# Patient Record
Sex: MALE | NOT HISPANIC OR LATINO | Employment: FULL TIME | URBAN - METROPOLITAN AREA
[De-identification: names, ages, dates, MRNs, and addresses within clinical notes are randomized per-mention and may not be internally consistent; named-entity substitution may affect disease eponyms.]

---

## 2018-04-05 ENCOUNTER — HOSPITAL ENCOUNTER (EMERGENCY)
Facility: CLINIC | Age: 33
Discharge: HOME | End: 2018-04-06
Attending: INTERNAL MEDICINE | Admitting: INTERNAL MEDICINE

## 2018-04-05 ENCOUNTER — APPOINTMENT (OUTPATIENT)
Dept: GENERAL RADIOLOGY | Facility: CLINIC | Age: 33
End: 2018-04-05
Attending: INTERNAL MEDICINE

## 2018-04-05 DIAGNOSIS — R07.89 OTHER CHEST PAIN: ICD-10-CM

## 2018-04-05 DIAGNOSIS — Z87.891 PERSONAL HISTORY OF TOBACCO USE, PRESENTING HAZARDS TO HEALTH: ICD-10-CM

## 2018-04-05 DIAGNOSIS — R07.9 ACUTE CHEST PAIN: ICD-10-CM

## 2018-04-05 LAB
ALBUMIN SERPL-MCNC: 3.8 G/DL (ref 3.4–5)
ALBUMIN UR-MCNC: NEGATIVE MG/DL
ALP SERPL-CCNC: 57 U/L (ref 40–150)
ALT SERPL W P-5'-P-CCNC: 24 U/L (ref 0–70)
ANION GAP SERPL CALCULATED.3IONS-SCNC: 9 MMOL/L (ref 3–14)
APPEARANCE UR: CLEAR
AST SERPL W P-5'-P-CCNC: 21 U/L (ref 0–45)
BASOPHILS # BLD AUTO: 0.1 10E9/L (ref 0–0.2)
BASOPHILS NFR BLD AUTO: 1 %
BILIRUB SERPL-MCNC: 0.2 MG/DL (ref 0.2–1.3)
BILIRUB UR QL STRIP: NEGATIVE
BUN SERPL-MCNC: 17 MG/DL (ref 7–30)
CALCIUM SERPL-MCNC: 8.8 MG/DL (ref 8.5–10.1)
CHLORIDE SERPL-SCNC: 107 MMOL/L (ref 94–109)
CO2 SERPL-SCNC: 26 MMOL/L (ref 20–32)
COLOR UR AUTO: YELLOW
CREAT SERPL-MCNC: 0.77 MG/DL (ref 0.66–1.25)
D DIMER PPP FEU-MCNC: 0.3 UG/ML FEU (ref 0–0.5)
DIFFERENTIAL METHOD BLD: NORMAL
EOSINOPHIL # BLD AUTO: 0.2 10E9/L (ref 0–0.7)
EOSINOPHIL NFR BLD AUTO: 3.3 %
ERYTHROCYTE [DISTWIDTH] IN BLOOD BY AUTOMATED COUNT: 12 % (ref 10–15)
GFR SERPL CREATININE-BSD FRML MDRD: >90 ML/MIN/1.7M2
GLUCOSE SERPL-MCNC: 109 MG/DL (ref 70–99)
GLUCOSE UR STRIP-MCNC: NEGATIVE MG/DL
HCT VFR BLD AUTO: 42 % (ref 40–53)
HGB BLD-MCNC: 14 G/DL (ref 13.3–17.7)
HGB UR QL STRIP: NEGATIVE
IMM GRANULOCYTES # BLD: 0 10E9/L (ref 0–0.4)
IMM GRANULOCYTES NFR BLD: 0.4 %
INR PPP: 0.95 (ref 0.86–1.14)
KETONES UR STRIP-MCNC: NEGATIVE MG/DL
LEUKOCYTE ESTERASE UR QL STRIP: NEGATIVE
LYMPHOCYTES # BLD AUTO: 2.3 10E9/L (ref 0.8–5.3)
LYMPHOCYTES NFR BLD AUTO: 30.8 %
MCH RBC QN AUTO: 29.6 PG (ref 26.5–33)
MCHC RBC AUTO-ENTMCNC: 33.3 G/DL (ref 31.5–36.5)
MCV RBC AUTO: 89 FL (ref 78–100)
MONOCYTES # BLD AUTO: 0.5 10E9/L (ref 0–1.3)
MONOCYTES NFR BLD AUTO: 6.2 %
NEUTROPHILS # BLD AUTO: 4.3 10E9/L (ref 1.6–8.3)
NEUTROPHILS NFR BLD AUTO: 58.3 %
NITRATE UR QL: NEGATIVE
NRBC # BLD AUTO: 0 10*3/UL
NRBC BLD AUTO-RTO: 0 /100
NT-PROBNP SERPL-MCNC: 20 PG/ML (ref 0–450)
PH UR STRIP: 6.5 PH (ref 5–7)
PLATELET # BLD AUTO: 179 10E9/L (ref 150–450)
POTASSIUM SERPL-SCNC: 4 MMOL/L (ref 3.4–5.3)
PROT SERPL-MCNC: 7.3 G/DL (ref 6.8–8.8)
RBC # BLD AUTO: 4.73 10E12/L (ref 4.4–5.9)
RBC #/AREA URNS AUTO: 0 /HPF (ref 0–2)
SODIUM SERPL-SCNC: 142 MMOL/L (ref 133–144)
SOURCE: NORMAL
SP GR UR STRIP: 1.01 (ref 1–1.03)
TROPONIN I BLD-MCNC: 0 UG/L (ref 0–0.1)
TROPONIN I SERPL-MCNC: <0.015 UG/L (ref 0–0.04)
UROBILINOGEN UR STRIP-MCNC: NORMAL MG/DL (ref 0–2)
WBC # BLD AUTO: 7.3 10E9/L (ref 4–11)
WBC #/AREA URNS AUTO: <1 /HPF (ref 0–5)

## 2018-04-05 PROCEDURE — 81001 URINALYSIS AUTO W/SCOPE: CPT | Performed by: INTERNAL MEDICINE

## 2018-04-05 PROCEDURE — 83880 ASSAY OF NATRIURETIC PEPTIDE: CPT | Performed by: INTERNAL MEDICINE

## 2018-04-05 PROCEDURE — 85025 COMPLETE CBC W/AUTO DIFF WBC: CPT | Performed by: INTERNAL MEDICINE

## 2018-04-05 PROCEDURE — 93005 ELECTROCARDIOGRAM TRACING: CPT

## 2018-04-05 PROCEDURE — 99285 EMERGENCY DEPT VISIT HI MDM: CPT | Mod: 25 | Performed by: INTERNAL MEDICINE

## 2018-04-05 PROCEDURE — 93010 ELECTROCARDIOGRAM REPORT: CPT | Mod: Z6 | Performed by: INTERNAL MEDICINE

## 2018-04-05 PROCEDURE — 99285 EMERGENCY DEPT VISIT HI MDM: CPT | Mod: 25

## 2018-04-05 PROCEDURE — 71045 X-RAY EXAM CHEST 1 VIEW: CPT

## 2018-04-05 PROCEDURE — 80053 COMPREHEN METABOLIC PANEL: CPT | Performed by: INTERNAL MEDICINE

## 2018-04-05 PROCEDURE — 84484 ASSAY OF TROPONIN QUANT: CPT

## 2018-04-05 PROCEDURE — 85379 FIBRIN DEGRADATION QUANT: CPT | Performed by: INTERNAL MEDICINE

## 2018-04-05 PROCEDURE — 84484 ASSAY OF TROPONIN QUANT: CPT | Performed by: INTERNAL MEDICINE

## 2018-04-05 PROCEDURE — 85610 PROTHROMBIN TIME: CPT | Performed by: INTERNAL MEDICINE

## 2018-04-05 ASSESSMENT — ENCOUNTER SYMPTOMS
CHEST TIGHTNESS: 1
SHORTNESS OF BREATH: 1

## 2018-04-05 NOTE — ED AVS SNAPSHOT
[facility], Emergency Department    [address]    [facility] [address]    Phone:  [phone]    Fax:  [phone]                                       Melvi Rebolledo   MRN: [id number]    Department:  [facility], Emergency Department   Date of Visit:  4/5/2018           After Visit Summary Signature Page     I have received my discharge instructions, and my questions have been answered. I have discussed any challenges I see with this plan with the nurse or doctor.    ..........................................................................................................................................  Patient/Patient Representative Signature      ..........................................................................................................................................  Patient Representative Print Name and Relationship to Patient    ..................................................               ................................................  Date                                            Time    ..........................................................................................................................................  Reviewed by Signature/Title    ...................................................              ..............................................  Date                                                            Time

## 2018-04-05 NOTE — ED AVS SNAPSHOT
[facility], Emergency Department    [address]    [place] [address]    Phone:  [phone]    Fax:  [phone]                                       Melvi Rebolledo   MRN: [id number]    Department:  [facility], Emergency Department   Date of Visit:  4/5/2018           Patient Information     Date Of Birth          1985        Your diagnoses for this visit were:     Acute chest pain        You were seen by Ashley Tsai MD.        Discharge Instructions          *CHEST PAIN, UNCERTAIN CAUSE    Based on your exam today, the exact cause of your chest pain is not certain. Your condition does not seem serious at this time, and your pain does not appear to be coming from your heart. However, sometimes the signs of a serious problem take more time to appear. Therefore, watch for the warning signs listed below.  HOME CARE:  1. Rest today and avoid strenuous activity.  2. Take any prescribed medicine as directed.  FOLLOW UP with your doctor in 1-3 days.   GET PROMPT MEDICAL ATTENTION if any of the following occur:    A change in the type of pain: if it feels different, becomes more severe, lasts longer, or begins to spread into your shoulder, arm, neck, jaw or back    Shortness of breath or increased pain with breathing    Weakness, dizziness, or fainting    Cough with blood or dark colored sputum (phlegm)    Fever over 101  F (38.3  C)    Swelling, pain or redness in one leg    1627-7163 [employer]. [address] [place] [address]. All rights reserved. This information is not intended as a substitute for professional medical care. Always follow your healthcare professional's instructions.  This information has been modified by your health care provider with permission from the publisher.  Please make an appointment to follow up with [facility] (phone: [phone], [facility] (phone: [phone]) and [facility] (phone: [phone]) in 3-7  days even if entirely better for possible stress test.     24 Hour Appointment Hotline       To make an appointment at any [facility] clinic, call [phone] ([phone]). If you don't have a family doctor or clinic, we will help you find one. [facility] clinics are conveniently located to serve the needs of you and your family.             Review of your medicines      Notice     You have not been prescribed any medications.            Procedures and tests performed during your visit     Procedure/Test Number of Times Performed    CBC with platelets differential 1    Cardiac Continuous Monitoring 1    Comprehensive metabolic panel 1    D dimer quantitative 1    EKG 12 lead 1    INR 1    ISTAT troponin nursing POCT 2    Nt probnp inpatient (BNP) 1    Troponin I 1    Troponin POCT 2    UA with Microscopic 1    XR Chest Port 1 View 1      Orders Needing Specimen Collection     None      Pending Results     Date and Time Order Name Status Description    4/5/2018 2132 XR Chest Port 1 View Preliminary             Pending Culture Results     No orders found for last 3 day(s).            Pending Results Instructions     If you had any lab results that were not finalized at the time of your Discharge, you can call the ED Lab Result RN at [phone]. You will be contacted by this team for any positive Lab results or changes in treatment. The nurses are available 7 days a week from 10A to 6:30P.  You can leave a message 24 hours per day and they will return your call.        Thank you for choosing [facility]       Thank you for choosing [facility] for your care. Our goal is always to provide you with excellent care. Hearing back from our patients is one way we can continue to improve our services. Please take a few minutes to complete the written survey that you may receive in the mail after you visit with us. Thank you!        [employer] Information     [employer] lets you send messages to your doctor, view your test results,  "renew your prescriptions, schedule appointments and more. To sign up, go to [email or web address] . Click on \"Log in\" on the left side of the screen, which will take you to the Welcome page. Then click on \"Sign up Now\" on the right side of the page.     You will be asked to enter the access code listed below, as well as some personal information. Please follow the directions to create your username and password.     Your access code is: [email or web address]  Expires: 2018 12:55 AM     Your access code will  in 90 days. If you need help or a new code, please call your [place] [facility] [place] clinic or [phone].        Care EveryWhere ID     This is your Care EveryWhere ID. This could be used by other organizations to access your [facility] medical records  [id number]        Equal Access to Services     DINORAH ALVARADO : Hadjocelin Urena, chance whipple, joe ron, mala metz . So [facility] [phone].    ATENCIÓN: Si habla español, tiene a adam disposición servicios gratuitos de asistencia lingüística. Llame al [phone].    We comply with applicable federal civil rights laws and [place] laws. We do not discriminate on the basis of race, color, national origin, age, disability, sex, sexual orientation, or gender identity.            After Visit Summary       This is your record. Keep this with you and show to your community pharmacist(s) and doctor(s) at your next visit.                  "

## 2018-04-06 VITALS
RESPIRATION RATE: 18 BRPM | BODY MASS INDEX: 33.61 KG/M2 | HEART RATE: 79 BPM | WEIGHT: 260 LBS | OXYGEN SATURATION: 97 % | TEMPERATURE: 98.3 F | DIASTOLIC BLOOD PRESSURE: 75 MMHG | SYSTOLIC BLOOD PRESSURE: 134 MMHG

## 2018-04-06 LAB
INTERPRETATION ECG - MUSE: NORMAL
TROPONIN I BLD-MCNC: 0 UG/L (ref 0–0.1)

## 2018-04-06 PROCEDURE — 84484 ASSAY OF TROPONIN QUANT: CPT

## 2018-04-06 RX ORDER — ONDANSETRON 2 MG/ML
4 INJECTION INTRAMUSCULAR; INTRAVENOUS ONCE
Status: DISCONTINUED | OUTPATIENT
Start: 2018-04-06 | End: 2018-04-06

## 2018-04-06 NOTE — DISCHARGE INSTRUCTIONS
*CHEST PAIN, UNCERTAIN CAUSE    Based on your exam today, the exact cause of your chest pain is not certain. Your condition does not seem serious at this time, and your pain does not appear to be coming from your heart. However, sometimes the signs of a serious problem take more time to appear. Therefore, watch for the warning signs listed below.  HOME CARE:  1. Rest today and avoid strenuous activity.  2. Take any prescribed medicine as directed.  FOLLOW UP with your doctor in 1-3 days.   GET PROMPT MEDICAL ATTENTION if any of the following occur:    A change in the type of pain: if it feels different, becomes more severe, lasts longer, or begins to spread into your shoulder, arm, neck, jaw or back    Shortness of breath or increased pain with breathing    Weakness, dizziness, or fainting    Cough with blood or dark colored sputum (phlegm)    Fever over 101  F (38.3  C)    Swelling, pain or redness in one leg    8422-0475 [employer]. [address] [place] [address]. All rights reserved. This information is not intended as a substitute for professional medical care. Always follow your healthcare professional's instructions.  This information has been modified by your health care provider with permission from the publisher.  Please make an appointment to follow up with [facility] (phone: [phone], [facility] (phone: [phone]) and [facility] (phone: [phone]) in 3-7 days even if entirely better for possible stress test.

## 2018-04-06 NOTE — ED PROVIDER NOTES
"  History     Chief Complaint   Patient presents with     Chest Pain     Pt reports on/off chest heaviness for for a month. Today chest pain started around 6PM-took one aspirin 325mg and slightly better. SOB earlier.     HPI  Melvi Rebolledo is a 32 year old male who presents to the Emergency Department with chest pain. Patient reports that he has been having intermittent chest pain for the past month. Earlier he said he was having shortness of breath which led to his trip to the ED. He is not sure if it is anxiety, or because of stress. He says his chest feels \"puffed up\". Patient denies history of diabetes, or high cholesterol. Although, patient notes that he has high blood pressure every time he goes to the clinic. Patient states that Asprin makes him feel better and that he took 2 325 mg of Asprin a few hours ago. He notes that walking makes him feel better as well. He used to smoke a pack a day but has not for four years. His father had a heart attack at 50, but the patient himself has never had a heart attack nor has he had a stress test or cath.    History reviewed. No pertinent past medical history.    History reviewed. No pertinent surgical history.    No family history on file.    Social History   Substance Use Topics     Smoking status: Former Smoker     Packs/day: 1.00     Types: Cigarettes     Smokeless tobacco: Never Used     Alcohol use No       No current facility-administered medications for this encounter.      No current outpatient prescriptions on file.        Allergies   Allergen Reactions     Pineapple Hives      Fresh pineapple Swelling          I have reviewed the Medications, Allergies, Past Medical and Surgical History, and Social History in the Epic system.    Review of Systems   Respiratory: Positive for chest tightness and shortness of breath.    Cardiovascular: Positive for chest pain.   All other systems reviewed and are negative.      Physical Exam   BP: 145/75  Pulse: 84  Temp: 98 "  F (36.7  C)  Resp: 18  Weight: 117.9 kg (260 lb)  SpO2: 98 %      Physical Exam   Constitutional: He is oriented to person, place, and time. No distress.   HENT:   Head: Atraumatic.   Mouth/Throat: Oropharynx is clear and moist. No oropharyngeal exudate.   Eyes: Pupils are equal, round, and reactive to light. No scleral icterus.   Neck: Neck supple. No JVD present.   Cardiovascular: Normal rate, normal heart sounds and intact distal pulses.  Exam reveals no gallop and no friction rub.    No murmur heard.  Pulmonary/Chest: Effort normal and breath sounds normal. No respiratory distress. He has no wheezes. He has no rales. He exhibits no tenderness.   Abdominal: Soft. Bowel sounds are normal. He exhibits no distension and no mass. There is no tenderness. There is no rebound and no guarding.   Musculoskeletal: He exhibits no edema or tenderness.   Neurological: He is alert and oriented to person, place, and time. No cranial nerve deficit. Coordination normal.   Skin: Skin is warm. No rash noted. He is not diaphoretic.       ED Course     ED Course     Procedures             EKG Interpretation:      Interpreted byDr. Tsai  Time reviewed: 21:45  Symptoms at time of EKG: Chest pain   Rhythm: normal sinus   Rate: 71 bpm  Axis: Normal  Ectopy: none  Conduction: normal  ST Segments/ T Waves: No ST-T wave changes  Q Waves: none  Comparison to prior: No old EKG available    Clinical Impression: no acute changes              Results for orders placed or performed during the hospital encounter of 04/05/18 (from the past 24 hour(s))   CBC with platelets differential     Status: None    Collection Time: 04/05/18  9:58 PM   Result Value Ref Range    WBC 7.3 4.0 - 11.0 10e9/L    RBC Count 4.73 4.4 - 5.9 10e12/L    Hemoglobin 14.0 13.3 - 17.7 g/dL    Hematocrit 42.0 40.0 - 53.0 %    MCV 89 78 - 100 fl    MCH 29.6 26.5 - 33.0 pg    MCHC 33.3 31.5 - 36.5 g/dL    RDW 12.0 10.0 - 15.0 %    Platelet Count 179 150 - 450 10e9/L    Diff  Method Automated Method     % Neutrophils 58.3 %    % Lymphocytes 30.8 %    % Monocytes 6.2 %    % Eosinophils 3.3 %    % Basophils 1.0 %    % Immature Granulocytes 0.4 %    Nucleated RBCs 0 0 /100    Absolute Neutrophil 4.3 1.6 - 8.3 10e9/L    Absolute Lymphocytes 2.3 0.8 - 5.3 10e9/L    Absolute Monocytes 0.5 0.0 - 1.3 10e9/L    Absolute Eosinophils 0.2 0.0 - 0.7 10e9/L    Absolute Basophils 0.1 0.0 - 0.2 10e9/L    Abs Immature Granulocytes 0.0 0 - 0.4 10e9/L    Absolute Nucleated RBC 0.0    INR     Status: None    Collection Time: 04/05/18  9:58 PM   Result Value Ref Range    INR 0.95 0.86 - 1.14   Comprehensive metabolic panel     Status: Abnormal    Collection Time: 04/05/18  9:58 PM   Result Value Ref Range    Sodium 142 133 - 144 mmol/L    Potassium 4.0 3.4 - 5.3 mmol/L    Chloride 107 94 - 109 mmol/L    Carbon Dioxide 26 20 - 32 mmol/L    Anion Gap 9 3 - 14 mmol/L    Glucose 109 (H) 70 - 99 mg/dL    Urea Nitrogen 17 7 - 30 mg/dL    Creatinine 0.77 0.66 - 1.25 mg/dL    GFR Estimate >90 >60 mL/min/1.7m2    GFR Estimate If Black >90 >60 mL/min/1.7m2    Calcium 8.8 8.5 - 10.1 mg/dL    Bilirubin Total 0.2 0.2 - 1.3 mg/dL    Albumin 3.8 3.4 - 5.0 g/dL    Protein Total 7.3 6.8 - 8.8 g/dL    Alkaline Phosphatase 57 40 - 150 U/L    ALT 24 0 - 70 U/L    AST 21 0 - 45 U/L   D dimer quantitative     Status: None    Collection Time: 04/05/18  9:58 PM   Result Value Ref Range    D Dimer 0.3 0.0 - 0.50 ug/ml FEU   Troponin I     Status: None    Collection Time: 04/05/18  9:58 PM   Result Value Ref Range    Troponin I ES <0.015 0.000 - 0.045 ug/L   Nt probnp inpatient (BNP)     Status: None    Collection Time: 04/05/18  9:58 PM   Result Value Ref Range    N-Terminal Pro BNP Inpatient 20 0 - 450 pg/mL   Troponin POCT     Status: None    Collection Time: 04/05/18 10:02 PM   Result Value Ref Range    Troponin I 0.00 0.00 - 0.10 ug/L   XR Chest Port 1 View     Status: None (Preliminary result)    Collection Time: 04/05/18 10:59  PM    Narrative    CHEST SINGLE VIEW PORTABLE  4/5/2018 10:59 PM     HISTORY: Chest pain.    COMPARISON: 6/10/2009.    FINDINGS: The lungs are clear. Normal-sized cardiac silhouette.      Impression    IMPRESSION: No convincing evidence of active cardiopulmonary disease.   UA with Microscopic     Status: None    Collection Time: 04/05/18 11:05 PM   Result Value Ref Range    Color Urine Yellow     Appearance Urine Clear     Glucose Urine Negative NEG^Negative mg/dL    Bilirubin Urine Negative NEG^Negative    Ketones Urine Negative NEG^Negative mg/dL    Specific Gravity Urine 1.013 1.003 - 1.035    Blood Urine Negative NEG^Negative    pH Urine 6.5 5.0 - 7.0 pH    Protein Albumin Urine Negative NEG^Negative mg/dL    Urobilinogen mg/dL Normal 0.0 - 2.0 mg/dL    Nitrite Urine Negative NEG^Negative    Leukocyte Esterase Urine Negative NEG^Negative    Source Midstream Urine     WBC Urine <1 0 - 5 /HPF    RBC Urine 0 0 - 2 /HPF   Troponin POCT     Status: None    Collection Time: 04/06/18 12:12 AM   Result Value Ref Range    Troponin I 0.00 0.00 - 0.10 ug/L           Labs Ordered and Resulted from Time of ED Arrival Up to the Time of Departure from the ED   COMPREHENSIVE METABOLIC PANEL - Abnormal; Notable for the following:        Result Value    Glucose 109 (*)     All other components within normal limits   CBC WITH PLATELETS DIFFERENTIAL   INR   D DIMER QUANTITATIVE   TROPONIN I   NT PROBNP INPATIENT   ROUTINE UA WITH MICROSCOPIC   ISTAT TROPONIN NURSING POCT   CARDIAC CONTINUOUS MONITORING   ISTAT TROPONIN NURSING POCT   TROPONIN POCT   TROPONIN POCT            Assessments & Plan (with Medical Decision Making)  Chest pain not clear etiology, but risk factor pos for former smoker and FHx-father in his 50's for MI ?HTN, EKG and trop x 2 neg, offered admission to ED OBS for r/o ACS but pt declined and follow up with PMD for stress test.       I have reviewed the nursing notes.    I have reviewed the findings, diagnosis,  plan and need for follow up with the patient.    New Prescriptions    No medications on file       Final diagnoses:   Acute chest pain   IChelsey, am serving as a trained medical scribe to document services personally performed by Ashley Tsai MD, based on the provider's statements to me.   Ashley PABLO MD, was physically present and have reviewed and verified the accuracy of this note documented by Chelsey Busch.      4/5/2018   [facility], [place], EMERGENCY DEPARTMENT     Ashley Tsai MD  04/06/18 0103

## 2018-09-17 ENCOUNTER — TRANSFERRED RECORDS (OUTPATIENT)
Dept: HEALTH INFORMATION MANAGEMENT | Facility: CLINIC | Age: 33
End: 2018-09-17

## 2019-01-25 NOTE — TELEPHONE ENCOUNTER
FUTURE VISIT INFORMATION:    Date: 1/28/19    Time: 0830    Location:  Cardiology  REFERRAL INFORMATION:    Referring provider:  Self    Referring providers clinic:      Reason for visit/diagnosis:   Chest Pain      All records in [facility] and care everywhere.  Requested EKG's and Stress Echo from [facility] and an EKG from [facility].

## 2019-01-28 ENCOUNTER — PRE VISIT (OUTPATIENT)
Dept: CARDIOLOGY | Facility: CLINIC | Age: 34
End: 2019-01-28

## 2019-01-28 ENCOUNTER — OFFICE VISIT (OUTPATIENT)
Dept: CARDIOLOGY | Facility: CLINIC | Age: 34
End: 2019-01-28
Attending: INTERNAL MEDICINE
Payer: COMMERCIAL

## 2019-01-28 VITALS
SYSTOLIC BLOOD PRESSURE: 128 MMHG | HEART RATE: 68 BPM | OXYGEN SATURATION: 96 % | DIASTOLIC BLOOD PRESSURE: 79 MMHG | WEIGHT: 272 LBS | HEIGHT: 75 IN | BODY MASS INDEX: 33.82 KG/M2

## 2019-01-28 DIAGNOSIS — R07.89 NON-CARDIAC CHEST PAIN: Primary | ICD-10-CM

## 2019-01-28 PROCEDURE — 93010 ELECTROCARDIOGRAM REPORT: CPT | Mod: ZP | Performed by: INTERNAL MEDICINE

## 2019-01-28 PROCEDURE — G0463 HOSPITAL OUTPT CLINIC VISIT: HCPCS | Mod: 25,ZF

## 2019-01-28 PROCEDURE — 93005 ELECTROCARDIOGRAM TRACING: CPT | Mod: ZF

## 2019-01-28 PROCEDURE — 99204 OFFICE O/P NEW MOD 45 MIN: CPT | Mod: ZP | Performed by: INTERNAL MEDICINE

## 2019-01-28 ASSESSMENT — PAIN SCALES - GENERAL: PAINLEVEL: NO PAIN (0)

## 2019-01-28 ASSESSMENT — MIFFLIN-ST. JEOR: SCORE: 2269.41

## 2019-01-28 NOTE — NURSING NOTE
Chief Complaint   Patient presents with     New Patient     32 yo male present for chest pain     Vitals were taken and medications were reconciled. EKG was performed    Gely ZAMUDIO  8:30 AM

## 2019-01-28 NOTE — PROGRESS NOTES
"I am delighted to see Melvi Rebolledo in consultation for chest pain.      History of Present Illness:  As you know, the patient is a 33 year old  Male who has c/o chest pain for a year. He describes these as a \"squeezing, spasm\" kind of pain, randomly can occur over his left or right pectoral areas, left axillary area, back. No dyspnea, dizziness, palpitations, syncope. He works as a , shift is 4pm-midnight. He is usually busy, with a physically active job including stairs, carrying tools,etc and is able to all of those activities without chest discomfort. He usually eats around 8pm. After 9pm things slow down and he is usually at a desk, on the computer. He experiences chest pain around 10pm and symptoms would last for hours. Frequently chest pain will get worse and he reports that often after he gets home chest hurst so bad that he's writhing around in bed. He's gone to the ED multiple times over the last year, during episodes of chest pain, without any abnormalities found. He does admit that he gets very anxious when he has chest pain and gets numbness and tingling in this fingers. When he gets to the ED he usually feels better just knowing he's at a hospital. He's recently had an exercise stress echo during which he had no symptoms and stress test was normal. PCP also suggested that he seeks counseling which he is starting. He drinks 1-2 cups of coffee a day, no soda, no energy drinks, no alcohol or recreational drugs, quit smoking 4 years ago.       The following portions of the patient's history were reviewed and updated as appropriate: allergies, current medications, past family history, past medical history, past social history, past surgical history, and the problem list.    Past Medical History:  none      Medications:   none      Allergies:    Allergies   Allergen Reactions     Pineapple Hives      Fresh pineapple Swelling        Family History: father CABG, first MI 50s, now 60    Psychosocial " "history:  reports that he has quit smoking. His smoking use included cigarettes. He smoked 1.00 pack per day. he has never used smokeless tobacco. He reports that he does not drink alcohol or use drugs.  Quit smoking 4 years ago.    Review of systems:   Cardiovascular: No palpitations, chest pain, shortness of breath at rest, dyspnea with exertion, orthopnea, paroxysmal nocturia dyspnea, nocturia, dizziness, syncope.    In addition,   Constitutional: No change in weight, sleep or appetite.  Normal energy.  No fever or chills  Eyes: Negative for vision changes or eye problems  ENT: No problems with ears, nose or throat.  No difficulty swallowing.  Resp: No coughing, wheezing or shortness of breath  GI: No nausea, vomiting,  heartburn, abdominal pain, diarrhea, constipation or change in bowel habits  : No urinary frequency or dysuria, bladder or kidney problems  Musculoskeletal: No significant muscle or joint pains  Neurologic: No headaches, numbness, tingling, weakness, problems with balance or coordination  Psychiatric: No problems with anxiety, depression or mental health  Heme/immune/allergy: No history of bleeding or clotting problems or anemia.  No allergies or immune system problems  Integumentary: No rashes,worrisome lesions or skin problems      Physical examination  Vitals: /79 (BP Location: Left arm, Patient Position: Chair, Cuff Size: Adult Large)   Pulse 68   Ht 1.905 m (6' 3\")   Wt 123.4 kg (272 lb)   SpO2 96%   BMI 34.00 kg/m    BMI= Body mass index is 34 kg/m .    Constitutional: In general, the patient is a pleasant male in no apparent distress.    Eyes: PERRLA.  EOMI.  Sclerae white, not injected.  ENT/mouth: Normiocephalic and atraumatic.  Nares clear.  Pharynx without erythema or exudate.  Dentition intact.  No adenopathy.  No thyromegaly. Carotids +2/2 bilaterally without bruits.  No jugular venous distension.   Card/Vasc: The PMI is in the 5th ICS in the midclavicular line. There is " no heave. Regular rate and rhythm. Normal S1, S2. No murmur, rub, click, or gallop. Pulses are normal bilaterally throughout. No peripheral edema.  Respiratory: Clear to asculation.  No ronchi, wheezes, rales.  No dullness to percussion.   GI: Abdomen is soft, nontender, nondistended. No organomegaly. No AAA.  No bruits.   Integument: No significant bruises or rashes  Neurological: The neurological examination reveal a patient who was oriented to person, place, and time.    Psych: Normal  Heme/Lymph/Immun: no significant adenopathy      I have reviewed the following labs/imaging:  Labs 4/5/18: K 4.0, cr 0.77, hgb 14, plt 179K, TSH 0.89, A1C 5.2    I have reviewed records from [facility]. Relevant findings are:  Exercise stress echo 12/17/18: bicycle, 16 minutes, 7.8 METS, no symptoms. 86% MPHR. BP increased to 194/100 at peak exercise. Baseline EF 60% with appropriate hyperkinesis, no wall motion abnl.    I have personally and independently reviewed the following:  EKG 1/28/19: sinus 65 bpm, MN 210ms, otherwise normal    Assessment :  Chest pain. Noncardiac. Offered reassurance. Could be from esophageal spasm, GERD, exacerbated by anxiety.     Plan:  Reassurance  Consider discussing prilosec, etc with PCP      I spent a total of 30 minutes face to face with  Melvi Rebolledo during today's office visit. Over 50% of this time was spent counseling the patient and/or coordinating care regarding symptoms, offering reassurance.      The patient is to return as needed. The patient understood the treatment plan as outlined above.  There were no barriers to learning.      Jena Ellsworth MD

## 2019-01-28 NOTE — LETTER
"1/28/2019      RE: Melvi Rebolledo  [address]  [facility] [place] [address]       Dear Colleague,    Thank you for the opportunity to participate in the care of your patient, Melvi Rebolledo, at the [facility] HEART [facility]. Please see a copy of my visit note below.    I am delighted to see Melvi Rebolledo in consultation for chest pain.      History of Present Illness:  As you know, the patient is a 33 year old  Male who has c/o chest pain for a year. He describes these as a \"squeezing, spasm\" kind of pain, randomly can occur over his left or right pectoral areas, left axillary area, back. No dyspnea, dizziness, palpitations, syncope. He works as a , shift is 4pm-midnight. He is usually busy, with a physically active job including stairs, carrying tools,etc and is able to all of those activities without chest discomfort. He usually eats around 8pm. After 9pm things slow down and he is usually at a desk, on the computer. He experiences chest pain around 10pm and symptoms would last for hours. Frequently chest pain will get worse and he reports that often after he gets home chest hurst so bad that he's writhing around in bed. He's gone to the ED multiple times over the last year, during episodes of chest pain, without any abnormalities found. He does admit that he gets very anxious when he has chest pain and gets numbness and tingling in this fingers. When he gets to the ED he usually feels better just knowing he's at a hospital. He's recently had an exercise stress echo during which he had no symptoms and stress test was normal. PCP also suggested that he seeks counseling which he is starting. He drinks 1-2 cups of coffee a day, no soda, no energy drinks, no alcohol or recreational drugs, quit smoking 4 years ago.       The following portions of the patient's history were reviewed and updated as appropriate: allergies, current medications, past family " "history, past medical history, past social history, past surgical history, and the problem list.    Past Medical History:  none      Medications:   none      Allergies:    Allergies   Allergen Reactions     Pineapple Hives      Fresh pineapple Swelling        Family History: father CABG, first MI 50s, now 60    Psychosocial history:  reports that he has quit smoking. His smoking use included cigarettes. He smoked 1.00 pack per day. he has never used smokeless tobacco. He reports that he does not drink alcohol or use drugs.  Quit smoking 4 years ago.    Review of systems:   Cardiovascular: No palpitations, chest pain, shortness of breath at rest, dyspnea with exertion, orthopnea, paroxysmal nocturia dyspnea, nocturia, dizziness, syncope.    In addition,   Constitutional: No change in weight, sleep or appetite.  Normal energy.  No fever or chills  Eyes: Negative for vision changes or eye problems  ENT: No problems with ears, nose or throat.  No difficulty swallowing.  Resp: No coughing, wheezing or shortness of breath  GI: No nausea, vomiting,  heartburn, abdominal pain, diarrhea, constipation or change in bowel habits  : No urinary frequency or dysuria, bladder or kidney problems  Musculoskeletal: No significant muscle or joint pains  Neurologic: No headaches, numbness, tingling, weakness, problems with balance or coordination  Psychiatric: No problems with anxiety, depression or mental health  Heme/immune/allergy: No history of bleeding or clotting problems or anemia.  No allergies or immune system problems  Integumentary: No rashes,worrisome lesions or skin problems      Physical examination  Vitals: /79 (BP Location: Left arm, Patient Position: Chair, Cuff Size: Adult Large)   Pulse 68   Ht 1.905 m (6' 3\")   Wt 123.4 kg (272 lb)   SpO2 96%   BMI 34.00 kg/m     BMI= Body mass index is 34 kg/m .    Constitutional: In general, the patient is a pleasant male in no apparent distress.    Eyes: PERRLA.  " EOMI.  Sclerae white, not injected.  ENT/mouth: Normiocephalic and atraumatic.  Nares clear.  Pharynx without erythema or exudate.  Dentition intact.  No adenopathy.  No thyromegaly. Carotids +2/2 bilaterally without bruits.  No jugular venous distension.   Card/Vasc: The PMI is in the 5th ICS in the midclavicular line. There is no heave. Regular rate and rhythm. Normal S1, S2. No murmur, rub, click, or gallop. Pulses are normal bilaterally throughout. No peripheral edema.  Respiratory: Clear to asculation.  No ronchi, wheezes, rales.  No dullness to percussion.   GI: Abdomen is soft, nontender, nondistended. No organomegaly. No AAA.  No bruits.   Integument: No significant bruises or rashes  Neurological: The neurological examination reveal a patient who was oriented to person, place, and time.    Psych: Normal  Heme/Lymph/Immun: no significant adenopathy      I have reviewed the following labs/imaging:  Labs 4/5/18: K 4.0, cr 0.77, hgb 14, plt 179K, TSH 0.89, A1C 5.2    I have reviewed records from [facility]. Relevant findings are:  Exercise stress echo 12/17/18: bicycle, 16 minutes, 7.8 METS, no symptoms. 86% MPHR. BP increased to 194/100 at peak exercise. Baseline EF 60% with appropriate hyperkinesis, no wall motion abnl.    I have personally and independently reviewed the following:  EKG 1/28/19: sinus 65 bpm, NM 210ms, otherwise normal    Assessment :  Chest pain. Noncardiac. Offered reassurance. Could be from esophageal spasm, GERD, exacerbated by anxiety.     Plan:  Reassurance  Consider discussing prilosec, etc with PCP      I spent a total of 30 minutes face to face with  Melvi Rebolledo during today's office visit. Over 50% of this time was spent counseling the patient and/or coordinating care regarding symptoms, offering reassurance.      The patient is to return as needed. The patient understood the treatment plan as outlined above.  There were no barriers to learning.      Jena Ellsworth MD

## 2019-01-28 NOTE — PATIENT INSTRUCTIONS
"You were seen today in the Cardiovascular Clinic at the [facility] ER.     Cardiology Providers you saw during your visit: Dr. Jena Ellsworth    Diagnosis:  Atypical chest pressure    Results: discussed with patient    Orders:   None    Medication Changes:   None    Recommendations:   None    Follow-up:   As needed         Please feel free to call me with any questions or concerns.       Pierce Quezada LPN     Questions and [phone].   First press #1 for the [employer] and then press #3 for \"Medical Questions\" to reach us Cardiology Nurses.      On Call Cardiologist for after hours or on weekends: [phone]   option #4 and ask to speak to the on-call Cardiologist.          If you need a medication refill please contact your pharmacy.  Please allow 3 business days for your refill to be completed.    "

## 2019-01-29 LAB — INTERPRETATION ECG - MUSE: NORMAL

## 2022-12-27 ENCOUNTER — HOSPITAL ENCOUNTER (EMERGENCY)
Facility: CLINIC | Age: 37
Discharge: HOME | End: 2022-12-27
Attending: EMERGENCY MEDICINE | Admitting: EMERGENCY MEDICINE
Payer: COMMERCIAL

## 2022-12-27 VITALS
RESPIRATION RATE: 20 BRPM | BODY MASS INDEX: 28.6 KG/M2 | HEART RATE: 71 BPM | HEIGHT: 75 IN | SYSTOLIC BLOOD PRESSURE: 148 MMHG | DIASTOLIC BLOOD PRESSURE: 78 MMHG | WEIGHT: 230 LBS | TEMPERATURE: 96.9 F | OXYGEN SATURATION: 100 %

## 2022-12-27 DIAGNOSIS — R19.7 VOMITING AND DIARRHEA: ICD-10-CM

## 2022-12-27 DIAGNOSIS — R11.10 VOMITING AND DIARRHEA: ICD-10-CM

## 2022-12-27 LAB
ALBUMIN SERPL-MCNC: 4.2 G/DL (ref 3.4–5)
ALP SERPL-CCNC: 50 U/L (ref 40–150)
ALT SERPL W P-5'-P-CCNC: 61 U/L (ref 0–70)
ANION GAP SERPL CALCULATED.3IONS-SCNC: 9 MMOL/L (ref 3–14)
AST SERPL W P-5'-P-CCNC: 38 U/L (ref 0–45)
BASOPHILS # BLD AUTO: 0.1 10E3/UL (ref 0–0.2)
BASOPHILS NFR BLD AUTO: 0 %
BILIRUB SERPL-MCNC: 0.7 MG/DL (ref 0.2–1.3)
BUN SERPL-MCNC: 20 MG/DL (ref 7–30)
CALCIUM SERPL-MCNC: 9.3 MG/DL (ref 8.5–10.1)
CHLORIDE BLD-SCNC: 105 MMOL/L (ref 94–109)
CO2 SERPL-SCNC: 25 MMOL/L (ref 20–32)
CREAT SERPL-MCNC: 0.85 MG/DL (ref 0.66–1.25)
EOSINOPHIL # BLD AUTO: 0.1 10E3/UL (ref 0–0.7)
EOSINOPHIL NFR BLD AUTO: 1 %
ERYTHROCYTE [DISTWIDTH] IN BLOOD BY AUTOMATED COUNT: 12.1 % (ref 10–15)
GFR SERPL CREATININE-BSD FRML MDRD: >90 ML/MIN/1.73M2
GLUCOSE BLD-MCNC: 157 MG/DL (ref 70–99)
HCT VFR BLD AUTO: 43 % (ref 40–53)
HGB BLD-MCNC: 14.7 G/DL (ref 13.3–17.7)
IMM GRANULOCYTES # BLD: 0.1 10E3/UL
IMM GRANULOCYTES NFR BLD: 1 %
LIPASE SERPL-CCNC: 209 U/L (ref 73–393)
LYMPHOCYTES # BLD AUTO: 0.6 10E3/UL (ref 0.8–5.3)
LYMPHOCYTES NFR BLD AUTO: 4 %
MCH RBC QN AUTO: 30.1 PG (ref 26.5–33)
MCHC RBC AUTO-ENTMCNC: 34.2 G/DL (ref 31.5–36.5)
MCV RBC AUTO: 88 FL (ref 78–100)
MONOCYTES # BLD AUTO: 0.8 10E3/UL (ref 0–1.3)
MONOCYTES NFR BLD AUTO: 6 %
NEUTROPHILS # BLD AUTO: 13.1 10E3/UL (ref 1.6–8.3)
NEUTROPHILS NFR BLD AUTO: 88 %
NRBC # BLD AUTO: 0 10E3/UL
NRBC BLD AUTO-RTO: 0 /100
PLATELET # BLD AUTO: 165 10E3/UL (ref 150–450)
POTASSIUM BLD-SCNC: 3.7 MMOL/L (ref 3.4–5.3)
PROT SERPL-MCNC: 7.5 G/DL (ref 6.8–8.8)
RBC # BLD AUTO: 4.89 10E6/UL (ref 4.4–5.9)
SODIUM SERPL-SCNC: 139 MMOL/L (ref 133–144)
WBC # BLD AUTO: 14.8 10E3/UL (ref 4–11)

## 2022-12-27 PROCEDURE — 250N000011 HC RX IP 250 OP 636: Performed by: EMERGENCY MEDICINE

## 2022-12-27 PROCEDURE — 36415 COLL VENOUS BLD VENIPUNCTURE: CPT | Performed by: EMERGENCY MEDICINE

## 2022-12-27 PROCEDURE — 96376 TX/PRO/DX INJ SAME DRUG ADON: CPT

## 2022-12-27 PROCEDURE — 85025 COMPLETE CBC W/AUTO DIFF WBC: CPT | Performed by: EMERGENCY MEDICINE

## 2022-12-27 PROCEDURE — 96361 HYDRATE IV INFUSION ADD-ON: CPT

## 2022-12-27 PROCEDURE — 82040 ASSAY OF SERUM ALBUMIN: CPT | Performed by: EMERGENCY MEDICINE

## 2022-12-27 PROCEDURE — 96375 TX/PRO/DX INJ NEW DRUG ADDON: CPT

## 2022-12-27 PROCEDURE — 258N000003 HC RX IP 258 OP 636: Performed by: EMERGENCY MEDICINE

## 2022-12-27 PROCEDURE — 83690 ASSAY OF LIPASE: CPT | Performed by: EMERGENCY MEDICINE

## 2022-12-27 PROCEDURE — 80053 COMPREHEN METABOLIC PANEL: CPT | Performed by: EMERGENCY MEDICINE

## 2022-12-27 PROCEDURE — 99284 EMERGENCY DEPT VISIT MOD MDM: CPT | Mod: 25

## 2022-12-27 PROCEDURE — 96374 THER/PROPH/DIAG INJ IV PUSH: CPT

## 2022-12-27 RX ORDER — SODIUM CHLORIDE, SODIUM LACTATE, POTASSIUM CHLORIDE, CALCIUM CHLORIDE 600; 310; 30; 20 MG/100ML; MG/100ML; MG/100ML; MG/100ML
125 INJECTION, SOLUTION INTRAVENOUS CONTINUOUS
Status: DISCONTINUED | OUTPATIENT
Start: 2022-12-27 | End: 2022-12-27 | Stop reason: HOSPADM

## 2022-12-27 RX ORDER — ONDANSETRON 4 MG/1
4 TABLET, ORALLY DISINTEGRATING ORAL EVERY 8 HOURS PRN
Qty: 10 TABLET | Refills: 0 | Status: SHIPPED | OUTPATIENT
Start: 2022-12-27 | End: 2022-12-30

## 2022-12-27 RX ORDER — KETOROLAC TROMETHAMINE 15 MG/ML
15 INJECTION, SOLUTION INTRAMUSCULAR; INTRAVENOUS ONCE
Status: COMPLETED | OUTPATIENT
Start: 2022-12-27 | End: 2022-12-27

## 2022-12-27 RX ORDER — ONDANSETRON 2 MG/ML
4 INJECTION INTRAMUSCULAR; INTRAVENOUS EVERY 30 MIN PRN
Status: DISCONTINUED | OUTPATIENT
Start: 2022-12-27 | End: 2022-12-27 | Stop reason: HOSPADM

## 2022-12-27 RX ADMIN — KETOROLAC TROMETHAMINE 15 MG: 15 INJECTION, SOLUTION INTRAMUSCULAR; INTRAVENOUS at 06:46

## 2022-12-27 RX ADMIN — FAMOTIDINE 20 MG: 10 INJECTION INTRAVENOUS at 05:09

## 2022-12-27 RX ADMIN — ONDANSETRON 4 MG: 2 INJECTION INTRAMUSCULAR; INTRAVENOUS at 05:10

## 2022-12-27 RX ADMIN — ONDANSETRON 4 MG: 2 INJECTION INTRAMUSCULAR; INTRAVENOUS at 07:07

## 2022-12-27 RX ADMIN — SODIUM CHLORIDE, POTASSIUM CHLORIDE, SODIUM LACTATE AND CALCIUM CHLORIDE 1000 ML: 600; 310; 30; 20 INJECTION, SOLUTION INTRAVENOUS at 05:06

## 2022-12-27 ASSESSMENT — ENCOUNTER SYMPTOMS
HEMATURIA: 1
DIARRHEA: 1
CHILLS: 1
ABDOMINAL PAIN: 1
VOMITING: 1
BLOOD IN STOOL: 0
FEVER: 1

## 2022-12-27 NOTE — ED TRIAGE NOTES
Pt reports multiple episodes of nausea, vomiting and diarrhea since midnight.      Triage Assessment     Row Name 12/27/22 0459       Respiratory WDL    Respiratory WDL WDL       Cardiac WDL    Cardiac WDL WDL       Cognitive/Neuro/Behavioral WDL    Cognitive/Neuro/Behavioral WDL WDL

## 2022-12-27 NOTE — ED PROVIDER NOTES
"  History   Chief Complaint:  Vomiting       The history is provided by the patient.      Melvi Rebolledo is a 37 year old otherwise healthy male who presents with vomiting and diarrhea for the last 6 hours. Symptoms began with diarrhea and then vomiting began later. Since the first episode, his symptoms have been cycling between the two. Admits to subjective fever, chills, and abdominal pain.  No blood in vomit or diarrhea.  Denies hematochezia or hematuria. Known sick exposure 2 days ago, cousins were ill with similar symptoms.     Review of Systems   Constitutional: Positive for chills and fever (Subjective).   Gastrointestinal: Positive for abdominal pain, diarrhea and vomiting. Negative for blood in stool.   Genitourinary: Positive for hematuria.   All other systems reviewed and are negative.    Allergies:  Pineapple  Fluoxetine  Sertraline     Medications:  This patient does not take any medications.     Past Medical History:     AUGUSTINE  PTSD   MDD   Eczema     Past Surgical History:    History reviewed. No pertinent surgical history.     Family History:    Father - T2DM  Mother - Anxiety, depression     Social History:  The patient presents to the ED alone.  PCP: Jaswinder Sifuentes     Physical Exam     Patient Vitals for the past 24 hrs:   BP Temp Temp src Pulse Resp SpO2 Height Weight   12/27/22 0500 (!) 148/78 96.9  F (36.1  C) Temporal 71 20 100 % 1.905 m (6' 3\") 104.3 kg (230 lb)       Physical Exam  General: alert, seated in a chair in triage.  Bent over an emesis bag.  HENT: mucous membranes moist  CV: regular rate, regular rhythm  Resp: normal effort, clear throughout, no crackles or wheezing  GI: abdomen soft, slightly tender, but no rebound or guarding.  Patient had an episode of diarrhea during the exam.  MSK: no bony tenderness  Skin: appropriately warm and dry  Extremities: no edema, calves non-tender  Neuro: alert, clear speech, oriented  Psych: normal mood and affect    Emergency Department " Course     Laboratory:  Labs Ordered and Resulted from Time of ED Arrival to Time of ED Departure   COMPREHENSIVE METABOLIC PANEL - Abnormal       Result Value    Sodium 139      Potassium 3.7      Chloride 105      Carbon Dioxide (CO2) 25      Anion Gap 9      Urea Nitrogen 20      Creatinine 0.85      Calcium 9.3      Glucose 157 (*)     Alkaline Phosphatase 50      AST 38      ALT 61      Protein Total 7.5      Albumin 4.2      Bilirubin Total 0.7      GFR Estimate >90     CBC WITH PLATELETS AND DIFFERENTIAL - Abnormal    WBC Count 14.8 (*)     RBC Count 4.89      Hemoglobin 14.7      Hematocrit 43.0      MCV 88      MCH 30.1      MCHC 34.2      RDW 12.1      Platelet Count 165      % Neutrophils 88      % Lymphocytes 4      % Monocytes 6      % Eosinophils 1      % Basophils 0      % Immature Granulocytes 1      NRBCs per 100 WBC 0      Absolute Neutrophils 13.1 (*)     Absolute Lymphocytes 0.6 (*)     Absolute Monocytes 0.8      Absolute Eosinophils 0.1      Absolute Basophils 0.1      Absolute Immature Granulocytes 0.1      Absolute NRBCs 0.0     LIPASE - Normal    Lipase 209          Emergency Department Course:       Reviewed:  I reviewed nursing notes, vitals and past medical history    Assessments:  0625 I obtained history and examined the patient as noted above.   0745 I rechecked the patient and explained findings.  He is feeling much better, able to tolerate oral fluids.  He was provided with a change of cloths.    Interventions:  0506 Lactated Ringers 1,000 mL IV  0509 Famotidine 20 mg IV  0510 Ondansetron 4 mg IV   0646 Ketorolac 15 mg IV   0707 Ondansetron 4 mg IV     Disposition:  The patient was discharged to home.      Impression & Plan   Due to hospital and departmental capacity constraints and prolonged wait times, this patient was evaluated in non-traditional circumstances such as in triage/waiting room, a hallway, etc. I explained the option to wait for a traditional treatment space and  apologized for the inconvenience. Given the circumstances, every attempt was made to provide for the patient's comfort and privacy and to perform the most thorough evaluation possible.    Medical Decision Making:  Melvi Rebolledo is a 37 year old male who presents for evaluation of nausea, vomiting and diarrhea with mild abdominal pain in a nonfocal abdominal exam.  Ill contacts include cousins, who he was exposed to over the Christmas holiday.  I considered a broad differential diagnosis for this patient including viral gastroenteritis, bacterial infection of the large intestine (salmonella, shigella, campylobacter, e coli, c. Diff, etc), bowel obstruction, intra-abdominal infection such as colitis, food poisoning, cholecystitis, UTI, pyelonephritis, and appendicitis.  There are no signs of worrisome intra-abdominal pathologies detected during the visit today.  Clinically, he does appear mildly dehydrated, but not toxic.  Given that he is not immunosupressed or toxic, antibiotics are not indicated.  He has a  benign abdominal exam without rebound, guarding, with only mild tenderness to palpation.  Supportive outpatient management is therefore indicated.  Abdominal pain precautions are given for home.   He did not have diarrhea, therefore, I did not send stool studies at this time.  No indication for CT at this time.  He passed oral challenge here in ED. he will return for persistent vomiting, worsening abdominal pain, bloody vomit or stool, fever, or other concerns.  Feels much improved after interventions in ED.     Diagnosis:    ICD-10-CM    1. Vomiting and diarrhea  R11.10     R19.7           Discharge Medications:  New Prescriptions    ONDANSETRON (ZOFRAN ODT) 4 MG ODT TAB    Take 1 tablet (4 mg) by mouth every 8 hours as needed for nausea       Scribe Disclosure:  Ai PABLO, am serving as a scribe at 6:29 AM on 12/27/2022 to document services personally performed by Dr. Sofia Ramey based on  my observations and the provider's statements to me.          Sofia Ramey MD  12/27/22 2866